# Patient Record
Sex: FEMALE | Race: WHITE | NOT HISPANIC OR LATINO | Employment: FULL TIME | URBAN - NONMETROPOLITAN AREA
[De-identification: names, ages, dates, MRNs, and addresses within clinical notes are randomized per-mention and may not be internally consistent; named-entity substitution may affect disease eponyms.]

---

## 2023-11-06 ENCOUNTER — HOSPITAL ENCOUNTER (EMERGENCY)
Facility: HOSPITAL | Age: 48
Discharge: HOME | End: 2023-11-06
Attending: STUDENT IN AN ORGANIZED HEALTH CARE EDUCATION/TRAINING PROGRAM | Admitting: STUDENT IN AN ORGANIZED HEALTH CARE EDUCATION/TRAINING PROGRAM
Payer: COMMERCIAL

## 2023-11-06 ENCOUNTER — APPOINTMENT (OUTPATIENT)
Dept: CT IMAGING | Facility: HOSPITAL | Age: 48
End: 2023-11-06
Payer: COMMERCIAL

## 2023-11-06 VITALS
HEART RATE: 78 BPM | HEIGHT: 67 IN | SYSTOLIC BLOOD PRESSURE: 132 MMHG | DIASTOLIC BLOOD PRESSURE: 76 MMHG | WEIGHT: 238 LBS | OXYGEN SATURATION: 99 % | RESPIRATION RATE: 20 BRPM | BODY MASS INDEX: 37.35 KG/M2 | TEMPERATURE: 98 F

## 2023-11-06 DIAGNOSIS — S02.2XXA CLOSED FRACTURE OF NASAL BONE, INITIAL ENCOUNTER: Primary | ICD-10-CM

## 2023-11-06 DIAGNOSIS — S09.93XA FACIAL INJURY, INITIAL ENCOUNTER: ICD-10-CM

## 2023-11-06 LAB
B-HCG UR QL: NEGATIVE
EXPIRATION DATE: NORMAL
INTERNAL NEGATIVE CONTROL: NEGATIVE
INTERNAL POSITIVE CONTROL: POSITIVE
Lab: NORMAL

## 2023-11-06 PROCEDURE — 70486 CT MAXILLOFACIAL W/O DYE: CPT

## 2023-11-06 PROCEDURE — 81025 URINE PREGNANCY TEST: CPT | Performed by: STUDENT IN AN ORGANIZED HEALTH CARE EDUCATION/TRAINING PROGRAM

## 2023-11-06 PROCEDURE — 63710000001 ONDANSETRON ODT 4 MG TABLET DISPERSIBLE: Performed by: STUDENT IN AN ORGANIZED HEALTH CARE EDUCATION/TRAINING PROGRAM

## 2023-11-06 PROCEDURE — 99284 EMERGENCY DEPT VISIT MOD MDM: CPT

## 2023-11-06 RX ORDER — AMOXICILLIN AND CLAVULANATE POTASSIUM 875; 125 MG/1; MG/1
1 TABLET, FILM COATED ORAL 2 TIMES DAILY
Qty: 10 TABLET | Refills: 0 | Status: SHIPPED | OUTPATIENT
Start: 2023-11-06 | End: 2023-11-11

## 2023-11-06 RX ORDER — ONDANSETRON 4 MG/1
4 TABLET, ORALLY DISINTEGRATING ORAL ONCE
Status: COMPLETED | OUTPATIENT
Start: 2023-11-06 | End: 2023-11-06

## 2023-11-06 RX ORDER — IBUPROFEN 600 MG/1
600 TABLET ORAL EVERY 6 HOURS PRN
Qty: 40 TABLET | Refills: 0 | Status: SHIPPED | OUTPATIENT
Start: 2023-11-06 | End: 2023-11-16

## 2023-11-06 RX ORDER — OXYCODONE HYDROCHLORIDE 5 MG/1
5 TABLET ORAL ONCE
Status: DISCONTINUED | OUTPATIENT
Start: 2023-11-06 | End: 2023-11-06 | Stop reason: HOSPADM

## 2023-11-06 RX ORDER — ACETAMINOPHEN 500 MG
1000 TABLET ORAL ONCE
Status: COMPLETED | OUTPATIENT
Start: 2023-11-06 | End: 2023-11-06

## 2023-11-06 RX ORDER — ACETAMINOPHEN 325 MG/1
650 TABLET ORAL EVERY 6 HOURS PRN
Qty: 80 TABLET | Refills: 0 | Status: SHIPPED | OUTPATIENT
Start: 2023-11-06 | End: 2023-11-16

## 2023-11-06 RX ORDER — OXYCODONE HYDROCHLORIDE 5 MG/1
5 TABLET ORAL EVERY 8 HOURS PRN
Qty: 9 TABLET | Refills: 0 | Status: SHIPPED | OUTPATIENT
Start: 2023-11-06 | End: 2023-11-09

## 2023-11-06 RX ORDER — ONDANSETRON 4 MG/1
4 TABLET, ORALLY DISINTEGRATING ORAL EVERY 6 HOURS PRN
Qty: 20 TABLET | Refills: 0 | Status: SHIPPED | OUTPATIENT
Start: 2023-11-06 | End: 2023-11-11

## 2023-11-06 RX ORDER — IBUPROFEN 400 MG/1
600 TABLET ORAL ONCE
Status: COMPLETED | OUTPATIENT
Start: 2023-11-06 | End: 2023-11-06

## 2023-11-06 RX ADMIN — IBUPROFEN 600 MG: 400 TABLET, FILM COATED ORAL at 20:43

## 2023-11-06 RX ADMIN — ACETAMINOPHEN 1000 MG: 500 TABLET, FILM COATED ORAL at 20:44

## 2023-11-06 RX ADMIN — ONDANSETRON 4 MG: 4 TABLET, ORALLY DISINTEGRATING ORAL at 20:44

## 2023-11-06 NOTE — Clinical Note
[facility] EMERGENCY DEPARTMENT  [address]  [facility] [address]  Phone: [phone]    Farhana Mchugh was seen and treated in our emergency department on 11/6/2023.  She may return to work on 11/08/2023.         Thank you for choosing [facility].    Hua Saleh MD

## 2023-11-06 NOTE — Clinical Note
[facility] EMERGENCY DEPARTMENT  [address]  [facility] [address]  Phone: [phone]    Farhana Mchugh was seen and treated in our emergency department on 11/6/2023.  She may return to work on 11/08/2023.         Thank you for choosing [facility].    Hua Saleh MD

## 2023-11-07 NOTE — ED PROVIDER NOTES
"EMERGENCY DEPARTMENT ATTENDING NOTE    Patient Name: Farhana Mchugh    Chief Complaint   Patient presents with    Facial Injury       PATIENT PRESENTATION:  Farhana Mchugh is a very pleasant 48 y.o. female with no severe past medical history presents emergency department due to blunt facial trauma with concerns for nose fracture.    Patient states that she was pulling a tree when the limb snapped hitting her in the face she notes a bruise to her nose and is concerned she broke her nose.  Denies any bleeding from her nose.  No loss consciousness during the event.  No history of bleeding disorders.  Denies any headache vision change hearing changes neck pain or any other symptoms other than pain to her nose.      PHYSICAL EXAM:   VS: /76   Pulse 78   Temp 98 °F (36.7 °C)   Resp 20   Ht 170.2 cm (67\")   Wt 108 kg (238 lb)   SpO2 99%   BMI 37.28 kg/m²   GENERAL: Well-appearing [place] woman sitting up stretcher no distress; well-nourished, well-developed, awake, alert, no acute distress, nontoxic appearing, comfortable  EYES: PERRL, sclerae anicteric, extraocular movements grossly intact, symmetric lids  EARS, NOSE, MOUTH, THROAT: Ecchymosis over the bridge of the nose; no epistaxis bilaterally; no dental trauma  NECK: symmetric, trachea midline  MUSCULOSKELETAL/EXTREMITIES: extremities without obvious deformity  SKIN: warm and dry with no obvious rashes  NEUROLOGIC: moving all 4 extremities symmetrically, CN II-XII grossly intact; GCS 15  PSYCHIATRIC: alert, pleasant and cooperative. Appropriate mood and affect.      MEDICAL DECISION MAKING:    Farhana Mchugh is a 48 y.o. female after blunt facial trauma with nose pain.    Differential Diagnosis Considered: Nasal bone fracture, contusion, facial fracture    Labs Ordered:  Labs Reviewed   POCT PEFORM URINE PREGNANCY - Normal        Imaging Ordered:   CT Maxillofacial Without Contrast   Final Result       Mildly comminuted and displaced nasal bone " fracture with mild soft   tissue swelling.               This report was signed and finalized on 11/6/2023 8:32 PM CST by Bruce Shirley.              Internal chart review:   No past medical history on file.    No past surgical history on file.    No Known Allergies    No current facility-administered medications for this encounter.    Current Outpatient Medications:     acetaminophen (Tylenol) 325 MG tablet, Take 2 tablets by mouth Every 6 (Six) Hours As Needed for Mild Pain or Moderate Pain for up to 10 days., Disp: 80 tablet, Rfl: 0    amoxicillin-clavulanate (AUGMENTIN) 875-125 MG per tablet, Take 1 tablet by mouth 2 (Two) Times a Day for 5 days., Disp: 10 tablet, Rfl: 0    ibuprofen (ADVIL,MOTRIN) 600 MG tablet, Take 1 tablet by mouth Every 6 (Six) Hours As Needed for Mild Pain or Moderate Pain for up to 10 days., Disp: 40 tablet, Rfl: 0    ondansetron ODT (ZOFRAN-ODT) 4 MG disintegrating tablet, Place 1 tablet on the tongue Every 6 (Six) Hours As Needed for Nausea or Vomiting for up to 5 days., Disp: 20 tablet, Rfl: 0    oxyCODONE (Roxicodone) 5 MG immediate release tablet, Take 1 tablet by mouth Every 8 (Eight) Hours As Needed for Severe Pain for up to 3 days., Disp: 9 tablet, Rfl: 0    My imaging interpretation: CT max face without contrast notable for comminuted nasal bone fracture.    ED Course and Re-evaluation: 49yo F resenting to the emergency department after blunt facial injury with a tree branch that lost consciousness.  Patient GCS 15 Nexus head CT negative. Concern for nasal bone fracture given mechanism and area of pain and tenderness.  CT max face was pursued which shows evidence of comminuted nasal bone fracture.  Patient given multimodal pain control.  Counseled patient regarding use of precautions with prolapse anabiotic therapy with multimodal pain control plan to follow with ENT as an outpatient which she expressed understanding.  Patient otherwise no signs of trauma well-appearing.   Given return precautions emergency department for worsening symptoms.      ED Diagnosis:  Facial injury, initial encounter; Closed fracture of nasal bone, initial encounter    Disposition: to home  Follow up plan: ENT follow up within 4 days, return to ED immediately if symptoms worsen        Signed:  Hua Saleh MD  Emergency Medicine Physician    Please note that portions of this note were completed with a voice recognition program.      Hua Saleh MD  11/07/23 0229

## 2023-11-07 NOTE — DISCHARGE INSTRUCTIONS
Today you are seen after facial injury and you have a nasal bone fracture.  As we discussed these often do not require operative management but I want to follow-up with her ENT surgeon as an outpatient to discuss the need.  Please avoid while your nose and take the prophylactic antibiotics.  I written him medicines for pain please take the Tylenol and ibuprofen regularly and take the oxycodone as needed.  If any of your symptoms worsen prior please return to the emergency department immediately.

## 2023-11-09 ENCOUNTER — OFFICE VISIT (OUTPATIENT)
Dept: OTOLARYNGOLOGY | Facility: CLINIC | Age: 48
End: 2023-11-09
Payer: COMMERCIAL

## 2023-11-09 VITALS
TEMPERATURE: 98 F | BODY MASS INDEX: 38.2 KG/M2 | HEART RATE: 67 BPM | SYSTOLIC BLOOD PRESSURE: 163 MMHG | DIASTOLIC BLOOD PRESSURE: 100 MMHG | HEIGHT: 67 IN | WEIGHT: 243.4 LBS

## 2023-11-09 DIAGNOSIS — S02.2XXA CLOSED FRACTURE OF NASAL BONE, INITIAL ENCOUNTER: Primary | ICD-10-CM

## 2023-11-09 RX ORDER — SUMATRIPTAN 50 MG/1
50 TABLET, FILM COATED ORAL ONCE AS NEEDED
COMMUNITY

## 2023-11-09 RX ORDER — BUTORPHANOL TARTRATE 10 MG/ML
10 SPRAY NASAL EVERY 4 HOURS PRN
COMMUNITY

## 2023-11-09 RX ORDER — LISINOPRIL 10 MG/1
20 TABLET ORAL DAILY
COMMUNITY

## 2023-11-09 RX ORDER — ROSUVASTATIN AND EZETIMIBE 10; 10.4 MG/1; MG/1
10 TABLET ORAL NIGHTLY
COMMUNITY
Start: 2023-07-09

## 2023-11-09 NOTE — PROGRESS NOTES
AMI Pearl  [facility] EAR NOSE & THROAT  [address], [address]  [facility] [address]  Fax [phone]  Phone [phone]      Visit Type: NEW PATIENT   Chief Complaint   Patient presents with    Nasal fracture     Happened 11-6-23, pulling a tree limb and one piece hit her on the nose        HPI  She complains of  nasal trauma . The symptoms are localized to the central face. The patient has had mild to moderate symptoms. The symptoms have been relatively constant for the last several days. The symptoms are aggravated by   being struck by a tree limb .     She denies having any difficulty breathing through her nose and actually feels as if she is breathing better than prior to the incident.      No past medical history on file.    No past surgical history on file.    Family History: Her family history is not on file.     Social History: She  has no history on file for tobacco use, alcohol use, and drug use.    Home Medications:  Ezetimibe-Rosuvastatin, SUMAtriptan, acetaminophen, amoxicillin-clavulanate, aspirin-acetaminophen-caffeine, butorphanol, ibuprofen, lisinopril, ondansetron ODT, and oxyCODONE    Allergies:  She has No Known Allergies.       Vital Signs:   Temp:  [98 °F (36.7 °C)] 98 °F (36.7 °C)  ENT Physical Exam  Nose  External Nose: nasal deformity not visible;  Internal Nose: bilateral intranasal mucosa edematous; septum normal;           Result Review    RESULTS REVIEW    I have reviewed the patients old records in the chart.   The following results/records were reviewed:   ED Provider Notes by Hua Saleh MD (11/06/2023 20:29)   CT Maxillofacial Without Contrast (11/06/2023 20:23) mildly displaced left nasal bone fracture    Assessment & Plan    Diagnoses and all orders for this visit:    1. Closed fracture of nasal bone, initial encounter (Primary)       Medical and surgical options were discussed including  observation and surgical management. Risks, benefits and alternatives were discussed and questions were answered. After considering the options, the patient decided to proceed with observation.    During rooming, she was found to be hypertensive.  Patient has a known history.  She took her BP medication about 7 this morning.  She denies having headache, chest discomfort, flushing, dizziness, or visual disturbances.  She has been advised to follow up with her primary care provider.     Return if symptoms worsen or fail to improve.      Electronically signed by AMI Pearl 11/09/23 9:36 AM CST.

## 2023-12-14 ENCOUNTER — TELEPHONE (OUTPATIENT)
Dept: OTOLARYNGOLOGY | Facility: CLINIC | Age: 48
End: 2023-12-14
Payer: COMMERCIAL

## 2023-12-14 NOTE — TELEPHONE ENCOUNTER
Caller: Farhana Mchugh     Relationship: SELF    Best call back number: [phone]    What is the best time to reach you: ANYTIME    Who are you requesting to speak with (clinical staff, provider,  specific staff member): CLINICAL    What was the call regarding: PT CALLED WANTING TO SCHEDULE AN APPT WITH OCTAVIA REGARDING HER NOSE. PT STATED SHE HAS A HOLE IN HER NOSE.

## 2023-12-15 NOTE — TELEPHONE ENCOUNTER
"Called pt to get appt. Pt states she just wanted to make sure it was not something she needed to be concerned about. Pt states \"its not really a hole it is an indention down the inner wall of (her) nose.\" In formed pt, as long as she is not having any pain/discomfort, trouble breathing, or there is not an actual hole in her septum then its nothing of concern. Pt denies the above. Pt confirms she does not need an appt and will call if she needs anything.   "